# Patient Record
Sex: FEMALE | URBAN - METROPOLITAN AREA
[De-identification: names, ages, dates, MRNs, and addresses within clinical notes are randomized per-mention and may not be internally consistent; named-entity substitution may affect disease eponyms.]

---

## 2023-03-09 ENCOUNTER — OFFICE VISIT (OUTPATIENT)
Dept: SURGERY | Age: 48
End: 2023-03-09

## 2023-03-09 DIAGNOSIS — L98.8 AGE-RELATED FACIAL WRINKLES: Primary | ICD-10-CM

## 2023-03-09 PROCEDURE — DM00120 PR DERM ONLY BOTOX INJ LEVEL 3

## 2023-03-09 NOTE — PROGRESS NOTES
[facility]    CC: Loss of facial volume & rhytids    HPI: This is a 41 y. o.female with a PMHx as delineated below who presents with the above described complaints. She wishes to proceed with facial filler to marionette lines. PMHx: No past medical history on file. PSHx: No past surgical history on file. Allergy: Not on File    SHx:   Social History     Socioeconomic History    Marital status: Unknown     Spouse name: Not on file    Number of children: Not on file    Years of education: Not on file    Highest education level: Not on file   Occupational History    Not on file   Tobacco Use    Smoking status: Not on file    Smokeless tobacco: Not on file   Substance and Sexual Activity    Alcohol use: Not on file    Drug use: Not on file    Sexual activity: Not on file   Other Topics Concern    Not on file   Social History Narrative    Not on file     Social Determinants of Health     Financial Resource Strain: Not on file   Food Insecurity: Not on file   Transportation Needs: Not on file   Physical Activity: Not on file   Stress: Not on file   Social Connections: Not on file   Intimate Partner Violence: Not on file   Housing Stability: Not on file     FHx: Noncontributory    Use of anticoagulation: No  Prior injections: Yes; complications with injections: No  Herpetic outbreak: No    Meds:   No current outpatient medications on file. No current facility-administered medications for this visit. EXAM    FACE: Transverse frontalis rhytids: Mild   Glabellar rhytids: Mild   Periorbital rhytids: Mild   Nasolabial folds: Mild   Perioral rhytids: Mild   Marionette lines: Mild    IMP: 41 y. o.female with facial aging who presents with desire for nonsurgical intervention  PLAN: 0.5 of the syringe given today, she will return in two weeks for the additional half of syringe.    Lot # [id number]  Exp 10/31/2023    $500.00 today    PROCEDURE NOTE    The patient signed informed consent discussing the risks (not limited to bleeding and or bruising, infection, allergy, unacceptable cosmetic appearance, ptosis, drooling, blindness, and even death) were discussed in detail with the patient. After all questions were answered in a satisfactory manner as deemed by the patient, she agreed to proceed. The patient was prepped with ice packs and alcohol pads. Injections were performed in the standard fashion according to the plan as delineated above. The patient tolerated the injections without difficulty. There were no immediate complications.     ADA Gonzales-CNP  [facility]  03/09/23

## 2023-03-30 ENCOUNTER — OFFICE VISIT (OUTPATIENT)
Dept: SURGERY | Age: 48
End: 2023-03-30

## 2023-03-30 DIAGNOSIS — Z09 POSTOP CHECK: Primary | ICD-10-CM

## 2023-03-30 NOTE — PROGRESS NOTES
discussing the risks (not limited to bleeding and or bruising, infection, allergy, unacceptable cosmetic appearance, ptosis, drooling, blindness, and even death) were discussed in detail with the patient. After all questions were answered in a satisfactory manner as deemed by the patient, she agreed to proceed. The patient was prepped with ice packs and alcohol pads. Injections were performed in the standard fashion according to the plan as delineated above. The patient tolerated the injections without difficulty. There were no immediate complications.     ADA Russell-CNP  [facility]  03/30/23